# Patient Record
Sex: FEMALE | Race: OTHER
[De-identification: names, ages, dates, MRNs, and addresses within clinical notes are randomized per-mention and may not be internally consistent; named-entity substitution may affect disease eponyms.]

---

## 2018-01-10 ENCOUNTER — HOSPITAL ENCOUNTER (EMERGENCY)
Dept: HOSPITAL 80 - FED | Age: 23
Discharge: HOME | End: 2018-01-10
Payer: COMMERCIAL

## 2018-01-10 VITALS
DIASTOLIC BLOOD PRESSURE: 68 MMHG | OXYGEN SATURATION: 97 % | TEMPERATURE: 98.8 F | RESPIRATION RATE: 16 BRPM | HEART RATE: 119 BPM | SYSTOLIC BLOOD PRESSURE: 123 MMHG
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
## 2018-01-10 DIAGNOSIS — S83.402A: Primary | ICD-10-CM
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
## 2018-01-10 DIAGNOSIS — S93.492A: ICD-10-CM
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
## 2018-01-10 NOTE — EDPHY
H & P


Stated Complaint: L knee injury


Time Seen by Provider: 01/10/18 15:38


HPI/ROS: 





CHIEF COMPLAINT:  Left knee and ankle injury





HISTORY OF PRESENT ILLNESS:  The patient presents the ED with complaints of 

acute left knee and ankle pain following a fall skiing.  The patient reportedly 

felt a pop in her left knee as she sustained a twisting type injury. She has 

been able to weight bear with some discomfort.  She complains of pain along the 

lateral aspect of the ankle and internally in the left knee.  The patient 

denies any head, neck or back pain. She denies prior history of knee or ankle 

injury.





REVIEW OF SYSTEMS:


A comprehensive 10 point review of systems is otherwise negative aside from 

elements mentioned in the history of present illness.








Source: Patient


Exam Limitations: No limitations





- Personal History


LMP (Females 10-55): 1-7 Days Ago


Current Tetanus/Diphtheria Vaccine: Yes


Current Tetanus Diphtheria and Acellular Pertussis (TDAP): Yes





- Medical/Surgical History


Hx Asthma: No


Hx Chronic Respiratory Disease: No


Hx Diabetes: No


Hx Cardiac Disease: No


Hx Renal Disease: No


Hx Cirrhosis: No


Hx Alcoholism: No


Hx HIV/AIDS: No


Hx Splenectomy or Spleen Trauma: No


Other PMH: denies





- Social History


Smoking Status: Never smoked





- Physical Exam


Exam: 








General Appearance:  Alert, no distress


Head:  Atraumatic


Eyes:  Pupils equal, round, reactive


ENT, Mouth:  No hemotympanum, no oral trauma


Neck:  Nontender, trachea midline


Respiratory:  No chest wall tender, subcutaneous air, lungs clear bilaterally


Cardiovascular:  Regular rate and rhythm


Abdomen:  Abdomen is soft and nontender, pelvis stable


Skin:  No lacerations, No abrasion


Back:  No midline T/L/S pain


Extremities:  Tenderness to palpation along the medial aspect of the left ankle

, no appreciable laxity or deformity, tenderness to palpation 


Neurological:  A&Ox3, normal motor function, normal sensory exam








Constitutional: 


 Initial Vital Signs











Temperature (C)  37.1 C   01/10/18 15:18


 


Heart Rate  119 H  01/10/18 15:18


 


Respiratory Rate  16   01/10/18 15:18


 


Blood Pressure  123/68 H  01/10/18 15:18


 


O2 Sat (%)  97   01/10/18 15:18








 











O2 Delivery Mode               Room Air














Allergies/Adverse Reactions: 


 





No Known Allergies Allergy (Unverified 01/10/18 15:18)


 








Home Medications: 














 Medication  Instructions  Recorded


 


NK [No Known Home Meds]  01/10/18














Medical Decision Making





- Diagnostics


Imaging Results: 


Left ankle x-ray:  Images reviewed by myself, no acute fracture appreciated.





Left knee x-ray:  Images reviewed by myself, no acute fracture appreciated.


ED Course/Re-evaluation: 





The patient presents to the ED with complaints of left ankle and knee pain 

following a twisting accident skiing.  Patient has no evidence of an obvious 

fracture noted on her x-rays.  The patient will be placed in a Ace wrap and 

given crutches.  She is advised to follow up with Orthopedic surgery for any 

persistent pain, swelling or sensation of instability assist with aside occult 

injury not noted on the x-ray today.








Differential Diagnosis: 





Differential diagnosis considered includes fracture, sprain, dislocation.





Departure





- Departure


Disposition: Home, Routine, Self-Care


Clinical Impression: 


Knee sprain


Qualifiers:


 Encounter type: initial encounter Involved ligament of knee: unspecified 

collateral ligament Laterality: left Qualified Code(s): S83.402A - Sprain of 

unspecified collateral ligament of left knee, initial encounter





Ankle sprain


Qualifiers:


 Encounter type: initial encounter Involved ligament of ankle: other ligament 

Laterality: left Qualified Code(s): S93.492A - Sprain of other ligament of left 

ankle, initial encounter





Condition: Good


Instructions:  Knee Sprain (ED)


Additional Instructions: 


1.  Crutches and Ace wrap as needed for support.


2.  Weight bear as tolerated.


3.  Please follow up with Dr. River from Orthopedic surgery for any pain, 

swelling or instability which persists past 5-7 days as this may be the sign of 

an injury not seen on the x-ray today.


4.  Take Ibuprofen or Motrin 600 mg by mouth three times a day.








Referrals: 


Mode River MD [Medical Doctor] - As per Instructions